# Patient Record
Sex: MALE | Race: WHITE | NOT HISPANIC OR LATINO | Employment: FULL TIME | URBAN - METROPOLITAN AREA
[De-identification: names, ages, dates, MRNs, and addresses within clinical notes are randomized per-mention and may not be internally consistent; named-entity substitution may affect disease eponyms.]

---

## 2020-03-30 ENCOUNTER — OFFICE VISIT (OUTPATIENT)
Dept: URGENT CARE | Facility: MEDICAL CENTER | Age: 25
End: 2020-03-30
Payer: COMMERCIAL

## 2020-03-30 VITALS
HEIGHT: 72 IN | TEMPERATURE: 97.5 F | RESPIRATION RATE: 18 BRPM | DIASTOLIC BLOOD PRESSURE: 82 MMHG | WEIGHT: 207 LBS | OXYGEN SATURATION: 98 % | HEART RATE: 71 BPM | SYSTOLIC BLOOD PRESSURE: 120 MMHG | BODY MASS INDEX: 28.04 KG/M2

## 2020-03-30 DIAGNOSIS — M54.9 UPPER BACK PAIN ON LEFT SIDE: Primary | ICD-10-CM

## 2020-03-30 PROCEDURE — S9083 URGENT CARE CENTER GLOBAL: HCPCS | Performed by: PHYSICIAN ASSISTANT

## 2020-03-30 PROCEDURE — G0382 LEV 3 HOSP TYPE B ED VISIT: HCPCS | Performed by: PHYSICIAN ASSISTANT

## 2020-03-30 RX ORDER — CYCLOBENZAPRINE HCL 10 MG
10 TABLET ORAL 3 TIMES DAILY PRN
Qty: 20 TABLET | Refills: 0 | Status: SHIPPED | OUTPATIENT
Start: 2020-03-30

## 2020-03-30 NOTE — PROGRESS NOTES
[facility]        NAME: Harpreet Obando is a 30 y o  male  : 1995    MRN: [id number]  DATE: 2020  TIME: 6:11 PM    Assessment and Plan   Upper back pain on left side [M54 9]  1  Upper back pain on left side  cyclobenzaprine (FLEXERIL) 10 mg tablet         Patient Instructions     Tylenol or Motrin for pain  Moist heat  Avoid lifting  Muscle relaxers as needed  Follow up with PCP in 3-5 days  Proceed to  ER if symptoms worsen  Chief Complaint     Chief Complaint   Patient presents with    Shoulder Pain     Sharp left shoulder pain x 6 days  Pt denies injury  History of Present Illness       Patient is a 28-year-old male who presents today with left upper back pain for the past 6 days  He denies any known injury or any falls  He does go to the gym a lot and lift but has not lifted recently  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  He denies repetitive motion of the left shoulder or arm  No pain down the arm or numbness or tingling  Has been using Tylenol and Advil with little relief  Review of Systems   Review of Systems   Constitutional: Negative for fever  Respiratory: Negative for shortness of breath  Cardiovascular: Negative for chest pain  Musculoskeletal: Positive for arthralgias  Negative for joint swelling  Skin: Negative for color change  Current Medications       Current Outpatient Medications:     cyclobenzaprine (FLEXERIL) 10 mg tablet, Take 1 tablet (10 mg total) by mouth 3 (three) times a day as needed for muscle spasms, Disp: 20 tablet, Rfl: 0    Current Allergies     Allergies as of 2020 - Reviewed 2020   Allergen Reaction Noted    Penicillins Rash 2014            The following portions of the patient's history were reviewed and updated as appropriate: allergies, current medications, past family history, past medical history, past social history, past surgical history and problem list      History reviewed   No pertinent past medical history  History reviewed  No pertinent surgical history  Family History   Problem Relation Age of Onset    No Known Problems Mother          Medications have been verified  Objective   /82   Pulse 71   Temp 97 5 °F (36 4 °C) (Temporal)   Resp 18   Ht 6' (1 829 m)   Wt 93 9 kg (207 lb)   SpO2 98%   BMI 28 07 kg/m²        Physical Exam     Physical Exam   Constitutional: He appears well-developed and well-nourished  Cardiovascular: Normal rate and regular rhythm  Pulmonary/Chest: Effort normal and breath sounds normal    Musculoskeletal: Normal range of motion  He exhibits tenderness  He exhibits no edema or deformity  Arms:  Skin: Skin is warm and dry

## 2022-12-16 ENCOUNTER — OFFICE VISIT (OUTPATIENT)
Dept: URGENT CARE | Facility: MEDICAL CENTER | Age: 27
End: 2022-12-16

## 2022-12-16 VITALS
RESPIRATION RATE: 16 BRPM | OXYGEN SATURATION: 97 % | HEIGHT: 71 IN | TEMPERATURE: 97.8 F | BODY MASS INDEX: 30.94 KG/M2 | HEART RATE: 80 BPM | SYSTOLIC BLOOD PRESSURE: 134 MMHG | DIASTOLIC BLOOD PRESSURE: 92 MMHG | WEIGHT: 221 LBS

## 2022-12-16 DIAGNOSIS — M10.9 ACUTE GOUT INVOLVING TOE OF LEFT FOOT, UNSPECIFIED CAUSE: Primary | ICD-10-CM

## 2022-12-16 RX ORDER — PREDNISONE 20 MG/1
20 TABLET ORAL 2 TIMES DAILY WITH MEALS
Qty: 10 TABLET | Refills: 0 | Status: SHIPPED | OUTPATIENT
Start: 2022-12-16 | End: 2022-12-21

## 2022-12-16 NOTE — PROGRESS NOTES
[facility]        NAME: Lilly Haynes is a 32 y o  male  : 1995    MRN: [id number]  DATE: 2022  TIME: 2:00 PM    Assessment and Plan   Acute gout involving toe of left foot, unspecified cause [M10 9]  1  Acute gout involving toe of left foot, unspecified cause  predniSONE 20 mg tablet            Patient Instructions     Take med as directed  Follow up with PCP in 3-5 days  Proceed to  ER if symptoms worsen  Chief Complaint     Chief Complaint   Patient presents with   • Gout     Left foot, hx of gout states ran out of allopurinol and foot started to get bad yesterday         History of Present Illness       HPI   Reports left great toe swelling, pain and redness  Duration for several days  Got worse yesterday  Hx of gout  Two previous ones this year  Previously on Allopurinol but has not been taking it  Review of Systems   Review of Systems   Constitutional: Negative for chills and fever  Respiratory: Negative for shortness of breath  Cardiovascular: Negative for chest pain  Musculoskeletal: Positive for arthralgias (left great toe), gait problem (bc of toe pain) and joint swelling  Skin: Positive for color change (red)  Neurological: Negative for numbness           Current Medications       Current Outpatient Medications:   •  predniSONE 20 mg tablet, Take 1 tablet (20 mg total) by mouth 2 (two) times a day with meals for 5 days, Disp: 10 tablet, Rfl: 0  •  cyclobenzaprine (FLEXERIL) 10 mg tablet, Take 1 tablet (10 mg total) by mouth 3 (three) times a day as needed for muscle spasms (Patient not taking: Reported on 2022), Disp: 20 tablet, Rfl: 0    Current Allergies     Allergies as of 2022 - Reviewed 2022   Allergen Reaction Noted   • Penicillins Rash 2014            The following portions of the patient's history were reviewed and updated as appropriate: allergies, current medications, past family history, past medical history, past social history, past surgical history and problem list      No past medical history on file  No past surgical history on file  Family History   Problem Relation Age of Onset   • No Known Problems Mother          Medications have been verified  Objective   /92   Pulse 80   Temp 97 8 °F (36 6 °C) (Temporal)   Resp 16   Ht 5' 11" (1 803 m)   Wt 100 kg (221 lb)   SpO2 97%   BMI 30 82 kg/m²   No LMP for male patient  Physical Exam     Physical Exam  Musculoskeletal:         General: Swelling (mild to moderate swelling of the left great toe) and tenderness (with palpation of the toe and with any Passive ROM) present  No deformity  Skin:     Capillary Refill: Capillary refill takes less than 2 seconds  Findings: Erythema (mild, at the joint) present  Neurological:      Gait: Gait abnormal (limping, favoring the left foot)

## 2023-01-31 ENCOUNTER — OFFICE VISIT (OUTPATIENT)
Dept: URGENT CARE | Facility: MEDICAL CENTER | Age: 28
End: 2023-01-31

## 2023-01-31 VITALS
OXYGEN SATURATION: 97 % | BODY MASS INDEX: 31.36 KG/M2 | DIASTOLIC BLOOD PRESSURE: 93 MMHG | SYSTOLIC BLOOD PRESSURE: 140 MMHG | TEMPERATURE: 97.2 F | WEIGHT: 224 LBS | HEIGHT: 71 IN | RESPIRATION RATE: 18 BRPM | HEART RATE: 78 BPM

## 2023-01-31 DIAGNOSIS — M10.9 ACUTE GOUT INVOLVING TOE OF LEFT FOOT, UNSPECIFIED CAUSE: Primary | ICD-10-CM

## 2023-01-31 RX ORDER — PREDNISONE 20 MG/1
TABLET ORAL
Qty: 15 TABLET | Refills: 0 | Status: SHIPPED | OUTPATIENT
Start: 2023-01-31

## 2023-01-31 RX ORDER — MULTIVITAMIN
1 TABLET ORAL DAILY
COMMUNITY

## 2023-01-31 NOTE — PATIENT INSTRUCTIONS
1   Over-the-counter ibuprofen and/or acetaminophen as needed for pain  2   Prednisone as prescribed  3   Ice and elevate the left foot 3-4 times daily for 20 to 30 minutes until symptoms improve  4   Go to the ER immediately for any significantly worsening symptoms  5   Follow-up with your primary care provider within 1 week

## 2023-01-31 NOTE — PROGRESS NOTES
[facility]        NAME: Talitha Blizzard is a 32 y o  male  : 1995    MRN: [id number]  DATE: 2023  TIME: 10:37 AM    Assessment and Plan   Acute gout involving toe of left foot, unspecified cause [M10 9]  1  Acute gout involving toe of left foot, unspecified cause  predniSONE 20 mg tablet            Patient Instructions     1  Over-the-counter ibuprofen and/or acetaminophen as needed for pain  2   Prednisone as prescribed  3   Ice and elevate the left foot 3-4 times daily for 20 to 30 minutes until symptoms improve  4   Go to the ER immediately for any significantly worsening symptoms  5   Follow-up with your primary care provider within 1 week  Chief Complaint     Chief Complaint   Patient presents with   • Foot Pain     Pt  With pain and swelling at the base of his left great toe that began yesterday  Has a hx of gout  History of Present Illness       27-year-old male patient with a 1 day history of severe left great toe pain, swelling, redness  Symptoms are consistent with previous episodes of gout  Patient denies any injury or exacerbating factors  Denies any dietary changes  Difficulty weightbearing due to pain  Review of Systems   Review of Systems   Constitutional: Negative for chills and fever  HENT: Negative for ear pain and sore throat  Eyes: Negative for pain and visual disturbance  Respiratory: Negative for cough and shortness of breath  Cardiovascular: Negative for chest pain and palpitations  Gastrointestinal: Negative for abdominal pain and vomiting  Genitourinary: Negative for dysuria and hematuria  Musculoskeletal: Positive for arthralgias and joint swelling  Negative for back pain  Skin: Positive for color change  Negative for rash  Neurological: Negative for seizures and syncope  All other systems reviewed and are negative          Current Medications       Current Outpatient Medications:   •  Multiple Vitamin (multivitamin) tablet, Take 1 tablet by mouth daily, Disp: , Rfl:   •  predniSONE 20 mg tablet, Take 3 tabs all at once daily for 3 days then 2 tabs for 2 days then 1 tab for 2 days  , Disp: 15 tablet, Rfl: 0  •  cyclobenzaprine (FLEXERIL) 10 mg tablet, Take 1 tablet (10 mg total) by mouth 3 (three) times a day as needed for muscle spasms (Patient not taking: Reported on 12/16/2022), Disp: 20 tablet, Rfl: 0    Current Allergies     Allergies as of 01/31/2023 - Reviewed 12/16/2022   Allergen Reaction Noted   • Penicillins Rash 07/24/2014            The following portions of the patient's history were reviewed and updated as appropriate: allergies, current medications, past family history, past medical history, past social history, past surgical history and problem list      No past medical history on file  No past surgical history on file  Family History   Problem Relation Age of Onset   • No Known Problems Mother          Medications have been verified  Objective   /93   Pulse 78   Temp (!) 97 2 °F (36 2 °C)   Resp 18   Ht 5' 11" (1 803 m)   Wt 102 kg (224 lb)   SpO2 97%   BMI 31 24 kg/m²        Physical Exam     Physical Exam  Vitals and nursing note reviewed  Constitutional:       Appearance: Normal appearance  HENT:      Head: Normocephalic  Nose: Nose normal       Mouth/Throat:      Mouth: Mucous membranes are dry  Pharynx: Oropharynx is clear  Eyes:      Conjunctiva/sclera: Conjunctivae normal       Pupils: Pupils are equal, round, and reactive to light  Cardiovascular:      Rate and Rhythm: Normal rate and regular rhythm  Pulses: Normal pulses  Pulmonary:      Effort: Pulmonary effort is normal       Breath sounds: Normal breath sounds  Abdominal:      Tenderness: There is no abdominal tenderness  Musculoskeletal:      Cervical back: Normal range of motion and neck supple  Comments: Left great toe is tender and swollen over the MTP joint    There is redness and slight warmth  No red streaking or other signs of infection  Exquisite pain with attempts at range of motion both actively and passively  Skin:     General: Skin is warm and dry  Capillary Refill: Capillary refill takes less than 2 seconds  Neurological:      General: No focal deficit present  Mental Status: He is alert and oriented to person, place, and time     Psychiatric:         Mood and Affect: Mood normal          Behavior: Behavior normal

## 2023-02-16 ENCOUNTER — RA CDI HCC (OUTPATIENT)
Dept: OTHER | Facility: HOSPITAL | Age: 28
End: 2023-02-16

## 2023-02-16 NOTE — PROGRESS NOTES
[facility]  coding opportunities       Chart reviewed, no opportunity found: CHART REVIEWED, NO OPPORTUNITY FOUND      This is a reminder to address ALL HCC (risk adjustment) codes as found on active problem list for 2023 as patient scores reset to zero KEYONNA      Patients Insurance        Commercial Insurance: [employer]

## 2023-02-23 ENCOUNTER — OFFICE VISIT (OUTPATIENT)
Dept: FAMILY MEDICINE CLINIC | Facility: CLINIC | Age: 28
End: 2023-02-23

## 2023-02-23 VITALS
SYSTOLIC BLOOD PRESSURE: 120 MMHG | HEART RATE: 68 BPM | OXYGEN SATURATION: 97 % | HEIGHT: 71 IN | DIASTOLIC BLOOD PRESSURE: 80 MMHG | BODY MASS INDEX: 30.94 KG/M2 | WEIGHT: 221 LBS

## 2023-02-23 DIAGNOSIS — Z13.220 SCREENING, LIPID: ICD-10-CM

## 2023-02-23 DIAGNOSIS — M1A.9XX0 CHRONIC GOUT INVOLVING TOE WITHOUT TOPHUS, UNSPECIFIED CAUSE, UNSPECIFIED LATERALITY: Primary | ICD-10-CM

## 2023-02-23 PROBLEM — M10.9 GOUT: Status: ACTIVE | Noted: 2023-02-23

## 2023-02-23 RX ORDER — ALLOPURINOL 100 MG/1
100 TABLET ORAL DAILY
Qty: 30 TABLET | Refills: 0 | Status: SHIPPED | OUTPATIENT
Start: 2023-02-23 | End: 2023-03-03 | Stop reason: SDUPTHER

## 2023-02-23 NOTE — PROGRESS NOTES
Subjective:      Patient ID: Parry Bernheim is a 32 y o  male  HPI    Patient is here to establish care  Patient has history of recurrent gout affecting his great toe  most recent episode was over 4 weeks ago  which  was treated with oral prednisone  Patient states that he has had at least 4 episodes in the past few months  No recent labs available  According to patient his uric acid a few years ago was 16  Requesting to be started on allopurinol which he took a few years ago  Patient denies any symptoms of fever/chills/chest pain or shortness of breath  Currently asymptomatic  According to patient his father had type I diabetes, coronary artery disease   Claressa Boas from complications of above-mentioned problems  Past Medical History:   Diagnosis Date   • Gout        Family History   Problem Relation Age of Onset   • No Known Problems Mother    • Diabetes Father    • Coronary artery disease Father        History reviewed  No pertinent surgical history  reports that he has never smoked  He uses smokeless tobacco  He reports current alcohol use  He reports that he does not use drugs  Current Outpatient Medications:   •  allopurinol (ZYLOPRIM) 100 mg tablet, Take 1 tablet (100 mg total) by mouth daily, Disp: 30 tablet, Rfl: 0  •  Multiple Vitamin (multivitamin) tablet, Take 1 tablet by mouth daily, Disp: , Rfl:   •  predniSONE 20 mg tablet, Take 3 tabs all at once daily for 3 days then 2 tabs for 2 days then 1 tab for 2 days  , Disp: 15 tablet, Rfl: 0  •  cyclobenzaprine (FLEXERIL) 10 mg tablet, Take 1 tablet (10 mg total) by mouth 3 (three) times a day as needed for muscle spasms, Disp: 20 tablet, Rfl: 0    The following portions of the patient's history were reviewed and updated as appropriate: allergies, current medications, past family history, past medical history, past social history, past surgical history and problem list     Review of Systems   Constitutional: Negative      Respiratory: Negative  Cardiovascular: Negative  Gastrointestinal: Negative  Musculoskeletal: Negative  Objective:    /80   Pulse 68   Ht 5' 11" (1 803 m)   Wt 100 kg (221 lb)   SpO2 97%   BMI 30 82 kg/m²      Physical Exam  Constitutional:       Appearance: Normal appearance  Cardiovascular:      Heart sounds: Normal heart sounds  Pulmonary:      Breath sounds: Normal breath sounds  Neurological:      Mental Status: He is oriented to person, place, and time  Psychiatric:         Mood and Affect: Mood normal            No results found for this or any previous visit (from the past 1008 hour(s))  Assessment/Plan:    No problem-specific Assessment & Plan notes found for this encounter  Problem List Items Addressed This Visit        Other    Gout - Primary     Patient advised to get metabolic labs including uric acid to assess  baseline  Started on allopurinol 100 mg  We will follow-up after 1 week to review labs  Relevant Medications    allopurinol (ZYLOPRIM) 100 mg tablet    Other Relevant Orders    Comprehensive metabolic panel    Uric acid    Screening, lipid    Relevant Orders    Lipid panel     I have spent a total time of 30 minutes on 02/23/23 in caring for this patient including Risks and benefits of tx options, Instructions for management, Patient and family education, Importance of tx compliance, Risk factor reductions, Impressions, Counseling / Coordination of care, Documenting in the medical record, Reviewing / ordering tests, medicine, procedures   and Obtaining or reviewing history

## 2023-03-02 LAB
ALBUMIN SERPL-MCNC: 4.6 G/DL (ref 3.6–5.1)
ALBUMIN/GLOB SERPL: 1.7 (CALC) (ref 1–2.5)
ALP SERPL-CCNC: 90 U/L (ref 36–130)
ALT SERPL-CCNC: 51 U/L (ref 9–46)
AST SERPL-CCNC: 27 U/L (ref 10–40)
BILIRUB SERPL-MCNC: 0.9 MG/DL (ref 0.2–1.2)
BUN SERPL-MCNC: 12 MG/DL (ref 7–25)
BUN/CREAT SERPL: ABNORMAL (CALC) (ref 6–22)
CALCIUM SERPL-MCNC: 9.7 MG/DL (ref 8.6–10.3)
CHLORIDE SERPL-SCNC: 102 MMOL/L (ref 98–110)
CHOLEST SERPL-MCNC: 242 MG/DL
CHOLEST/HDLC SERPL: 4.7 (CALC)
CO2 SERPL-SCNC: 27 MMOL/L (ref 20–32)
CREAT SERPL-MCNC: 0.78 MG/DL (ref 0.6–1.24)
GFR/BSA.PRED SERPLBLD CYS-BASED-ARV: 124 ML/MIN/1.73M2
GLOBULIN SER CALC-MCNC: 2.7 G/DL (CALC) (ref 1.9–3.7)
GLUCOSE SERPL-MCNC: 101 MG/DL (ref 65–99)
HDLC SERPL-MCNC: 52 MG/DL
LDLC SERPL CALC-MCNC: 149 MG/DL (CALC)
NONHDLC SERPL-MCNC: 190 MG/DL (CALC)
POTASSIUM SERPL-SCNC: 4.2 MMOL/L (ref 3.5–5.3)
PROT SERPL-MCNC: 7.3 G/DL (ref 6.1–8.1)
SODIUM SERPL-SCNC: 137 MMOL/L (ref 135–146)
TRIGL SERPL-MCNC: 241 MG/DL
URATE SERPL-MCNC: 7.6 MG/DL (ref 4–8)

## 2023-03-03 ENCOUNTER — OFFICE VISIT (OUTPATIENT)
Dept: FAMILY MEDICINE CLINIC | Facility: CLINIC | Age: 28
End: 2023-03-03

## 2023-03-03 VITALS
HEIGHT: 71 IN | OXYGEN SATURATION: 99 % | SYSTOLIC BLOOD PRESSURE: 122 MMHG | BODY MASS INDEX: 31.11 KG/M2 | WEIGHT: 222.2 LBS | HEART RATE: 73 BPM | DIASTOLIC BLOOD PRESSURE: 80 MMHG

## 2023-03-03 DIAGNOSIS — R73.01 ELEVATED FASTING BLOOD SUGAR: ICD-10-CM

## 2023-03-03 DIAGNOSIS — E78.2 MIXED HYPERLIPIDEMIA: ICD-10-CM

## 2023-03-03 DIAGNOSIS — M1A.9XX0 CHRONIC GOUT INVOLVING TOE WITHOUT TOPHUS, UNSPECIFIED CAUSE, UNSPECIFIED LATERALITY: Primary | ICD-10-CM

## 2023-03-03 RX ORDER — COLCHICINE 0.6 MG/1
TABLET ORAL
Qty: 15 TABLET | Refills: 0 | Status: SHIPPED | OUTPATIENT
Start: 2023-03-03

## 2023-03-03 RX ORDER — ALLOPURINOL 100 MG/1
100 TABLET ORAL DAILY
Qty: 90 TABLET | Refills: 1 | Status: SHIPPED | OUTPATIENT
Start: 2023-03-03

## 2023-03-03 NOTE — ASSESSMENT & PLAN NOTE
Uric acid 7 6  Patient has had intermittent flareups of gout which have responded to prednisone  Started on allopurinol 100 mg  Advised to improve hydration    Repeat metabolic labs including uric acid at 4 months interval

## 2023-03-03 NOTE — PROGRESS NOTES
Subjective:      Patient ID: Scott Delgado is a 32 y o  male  Hyperlipidemia  This is a new problem  This is a new diagnosis  The problem is uncontrolled  Recent lipid tests were reviewed and are high  Factors aggravating his hyperlipidemia include fatty foods  Pertinent negatives include no chest pain or focal sensory loss  He is currently on no antihyperlipidemic treatment  Risk factors for coronary artery disease include dyslipidemia and male sex  Patient is here to follow-up on gout and review labs  Noted to have a uric acid of 7 6  Was started on allopurinol 1 week ago which he has been tolerating well  His labs also reveal borderline fasting blood sugar of 101  Past Medical History:   Diagnosis Date   • Gout        Family History   Problem Relation Age of Onset   • No Known Problems Mother    • Diabetes Father    • Coronary artery disease Father        History reviewed  No pertinent surgical history  reports that he has never smoked  He uses smokeless tobacco  He reports current alcohol use  He reports that he does not use drugs  Current Outpatient Medications:   •  allopurinol (ZYLOPRIM) 100 mg tablet, Take 1 tablet (100 mg total) by mouth daily, Disp: 90 tablet, Rfl: 1  •  colchicine (COLCRYS) 0 6 mg tablet, Take 2 tablet together, repeat 1 tablet after 1 hour , Disp: 15 tablet, Rfl: 0  •  cyclobenzaprine (FLEXERIL) 10 mg tablet, Take 1 tablet (10 mg total) by mouth 3 (three) times a day as needed for muscle spasms, Disp: 20 tablet, Rfl: 0  •  Multiple Vitamin (multivitamin) tablet, Take 1 tablet by mouth daily, Disp: , Rfl:   •  predniSONE 20 mg tablet, Take 3 tabs all at once daily for 3 days then 2 tabs for 2 days then 1 tab for 2 days  , Disp: 15 tablet, Rfl: 0    The following portions of the patient's history were reviewed and updated as appropriate: allergies, current medications, past family history, past medical history, past social history, past surgical history and problem list     Review of Systems   Constitutional: Negative  Respiratory: Negative  Cardiovascular: Negative for chest pain  Musculoskeletal: Negative  Objective:    /80   Pulse 73   Ht 5' 11" (1 803 m)   Wt 101 kg (222 lb 3 2 oz)   SpO2 99%   BMI 30 99 kg/m²      Physical Exam  Constitutional:       Appearance: Normal appearance  Cardiovascular:      Heart sounds: Normal heart sounds  Pulmonary:      Breath sounds: Normal breath sounds  Musculoskeletal:      Right lower leg: No edema  Left lower leg: No edema  Neurological:      Mental Status: He is oriented to person, place, and time     Psychiatric:         Mood and Affect: Mood normal            Recent Results (from the past 1008 hour(s))   Lipid panel    Collection Time: 03/01/23  2:26 PM   Result Value Ref Range    Total Cholesterol 242 (H) <200 mg/dL    HDL 52 > OR = 40 mg/dL    Triglycerides 241 (H) <150 mg/dL    LDL Calculated 149 (H) mg/dL (calc)    Chol HDLC Ratio 4 7 <5 0 (calc)    Non-HDL Cholesterol 190 (H) <130 mg/dL (calc)   Uric acid    Collection Time: 03/01/23  2:26 PM   Result Value Ref Range    Uric Acid 7 6 4 0 - 8 0 mg/dL   Comprehensive metabolic panel    Collection Time: 03/01/23  2:26 PM   Result Value Ref Range    Glucose, Random 101 (H) 65 - 99 mg/dL    BUN 12 7 - 25 mg/dL    Creatinine 0 78 0 60 - 1 24 mg/dL    eGFR 124 > OR = 60 mL/min/1 73m2    SL AMB BUN/CREATININE RATIO NOT APPLICABLE 6 - 22 (calc)    Sodium 137 135 - 146 mmol/L    Potassium 4 2 3 5 - 5 3 mmol/L    Chloride 102 98 - 110 mmol/L    CO2 27 20 - 32 mmol/L    Calcium 9 7 8 6 - 10 3 mg/dL    Protein, Total 7 3 6 1 - 8 1 g/dL    Albumin 4 6 3 6 - 5 1 g/dL    Globulin 2 7 1 9 - 3 7 g/dL (calc)    Albumin/Globulin Ratio 1 7 1 0 - 2 5 (calc)    TOTAL BILIRUBIN 0 9 0 2 - 1 2 mg/dL    Alkaline Phosphatase 90 36 - 130 U/L    AST 27 10 - 40 U/L    ALT 51 (H) 9 - 46 U/L       Assessment/Plan:    No problem-specific Assessment & Plan notes found for this encounter  Problem List Items Addressed This Visit        Other    Gout - Primary     Uric acid 7 6  Patient has had intermittent flareups of gout which have responded to prednisone  Started on allopurinol 100 mg  Advised to improve hydration  Repeat metabolic labs including uric acid at 4 months interval          Relevant Medications    allopurinol (ZYLOPRIM) 100 mg tablet    colchicine (COLCRYS) 0 6 mg tablet    Other Relevant Orders    Uric acid    Elevated fasting blood sugar     Advised low-carb diet  Check A1c to rule out diabetes  Relevant Orders    Comprehensive metabolic panel    Hemoglobin A1C    Mixed hyperlipidemia     Patient noted to have elevated LDL and triglycerides  Does not eat healthy diet  Different treatment options to improve cholesterol panel discussed with patient  Patient would like to start with diet and lifestyle modifications  Recommended to repeat metabolic panel at 4 months           Relevant Orders    Lipid panel

## 2023-03-03 NOTE — ASSESSMENT & PLAN NOTE
Patient noted to have elevated LDL and triglycerides  Does not eat healthy diet  Different treatment options to improve cholesterol panel discussed with patient  Patient would like to start with diet and lifestyle modifications  Recommended to repeat metabolic panel at 4 months

## 2023-04-24 PROBLEM — Z13.220 SCREENING, LIPID: Status: RESOLVED | Noted: 2023-02-23 | Resolved: 2023-04-24

## 2023-07-14 ENCOUNTER — RA CDI HCC (OUTPATIENT)
Dept: OTHER | Facility: HOSPITAL | Age: 28
End: 2023-07-14

## 2023-07-14 NOTE — PROGRESS NOTES
[address] [facility] coding opportunities       Chart reviewed, no opportunity found: CHART REVIEWED, NO OPPORTUNITY FOUND        Patients Insurance        Commercial Insurance: [employer] [place]

## 2023-07-28 DIAGNOSIS — M1A.9XX0 CHRONIC GOUT INVOLVING TOE WITHOUT TOPHUS, UNSPECIFIED CAUSE, UNSPECIFIED LATERALITY: ICD-10-CM

## 2023-07-28 RX ORDER — ALLOPURINOL 100 MG/1
100 TABLET ORAL DAILY
Qty: 30 TABLET | Refills: 0 | Status: SHIPPED | OUTPATIENT
Start: 2023-07-28

## 2023-07-28 NOTE — TELEPHONE ENCOUNTER
Patient requested Allopurinol refill. He was advised that he is in need of labs.   30 day supply sent to the pharmacy until labs are done

## 2023-08-16 ENCOUNTER — APPOINTMENT (OUTPATIENT)
Dept: LAB | Facility: MEDICAL CENTER | Age: 28
End: 2023-08-16
Payer: COMMERCIAL

## 2023-08-16 DIAGNOSIS — Z13.220 SCREENING, LIPID: ICD-10-CM

## 2023-08-16 DIAGNOSIS — R73.01 ELEVATED FASTING BLOOD SUGAR: ICD-10-CM

## 2023-08-16 DIAGNOSIS — E78.2 MIXED HYPERLIPIDEMIA: ICD-10-CM

## 2023-08-16 DIAGNOSIS — M1A.9XX0 CHRONIC GOUT INVOLVING TOE WITHOUT TOPHUS, UNSPECIFIED CAUSE, UNSPECIFIED LATERALITY: ICD-10-CM

## 2023-08-16 LAB
ALBUMIN SERPL BCP-MCNC: 4.2 G/DL (ref 3.5–5)
ALP SERPL-CCNC: 85 U/L (ref 46–116)
ALT SERPL W P-5'-P-CCNC: 76 U/L (ref 12–78)
ANION GAP SERPL CALCULATED.3IONS-SCNC: 4 MMOL/L
AST SERPL W P-5'-P-CCNC: 41 U/L (ref 5–45)
BILIRUB SERPL-MCNC: 1.07 MG/DL (ref 0.2–1)
BUN SERPL-MCNC: 11 MG/DL (ref 5–25)
CALCIUM SERPL-MCNC: 9 MG/DL (ref 8.3–10.1)
CHLORIDE SERPL-SCNC: 107 MMOL/L (ref 96–108)
CHOLEST SERPL-MCNC: 225 MG/DL
CO2 SERPL-SCNC: 26 MMOL/L (ref 21–32)
CREAT SERPL-MCNC: 0.87 MG/DL (ref 0.6–1.3)
EST. AVERAGE GLUCOSE BLD GHB EST-MCNC: 100 MG/DL
GFR SERPL CREATININE-BSD FRML MDRD: 115 ML/MIN/1.73SQ M
GLUCOSE P FAST SERPL-MCNC: 73 MG/DL (ref 65–99)
HBA1C MFR BLD: 5.1 %
HDLC SERPL-MCNC: 48 MG/DL
LDLC SERPL CALC-MCNC: 135 MG/DL (ref 0–100)
NONHDLC SERPL-MCNC: 177 MG/DL
POTASSIUM SERPL-SCNC: 4.2 MMOL/L (ref 3.5–5.3)
PROT SERPL-MCNC: 7.6 G/DL (ref 6.4–8.4)
SODIUM SERPL-SCNC: 137 MMOL/L (ref 135–147)
TRIGL SERPL-MCNC: 210 MG/DL
URATE SERPL-MCNC: 7 MG/DL (ref 3.5–8.5)

## 2023-08-16 PROCEDURE — 36415 COLL VENOUS BLD VENIPUNCTURE: CPT

## 2023-08-16 PROCEDURE — 83036 HEMOGLOBIN GLYCOSYLATED A1C: CPT

## 2023-08-16 PROCEDURE — 84550 ASSAY OF BLOOD/URIC ACID: CPT

## 2023-08-16 PROCEDURE — 80061 LIPID PANEL: CPT

## 2023-08-16 PROCEDURE — 80053 COMPREHEN METABOLIC PANEL: CPT

## 2023-08-17 ENCOUNTER — OFFICE VISIT (OUTPATIENT)
Dept: FAMILY MEDICINE CLINIC | Facility: CLINIC | Age: 28
End: 2023-08-17
Payer: COMMERCIAL

## 2023-08-17 VITALS
OXYGEN SATURATION: 99 % | SYSTOLIC BLOOD PRESSURE: 122 MMHG | WEIGHT: 211 LBS | HEART RATE: 60 BPM | BODY MASS INDEX: 29.54 KG/M2 | HEIGHT: 71 IN | DIASTOLIC BLOOD PRESSURE: 78 MMHG

## 2023-08-17 DIAGNOSIS — E78.2 MIXED HYPERLIPIDEMIA: ICD-10-CM

## 2023-08-17 DIAGNOSIS — M1A.9XX0 CHRONIC GOUT INVOLVING TOE WITHOUT TOPHUS, UNSPECIFIED CAUSE, UNSPECIFIED LATERALITY: Primary | ICD-10-CM

## 2023-08-17 DIAGNOSIS — Z23 NEED FOR VACCINATION: ICD-10-CM

## 2023-08-17 PROCEDURE — 90715 TDAP VACCINE 7 YRS/> IM: CPT | Performed by: FAMILY MEDICINE

## 2023-08-17 PROCEDURE — 90471 IMMUNIZATION ADMIN: CPT | Performed by: FAMILY MEDICINE

## 2023-08-17 PROCEDURE — 99214 OFFICE O/P EST MOD 30 MIN: CPT | Performed by: FAMILY MEDICINE

## 2023-08-17 RX ORDER — ALLOPURINOL 100 MG/1
200 TABLET ORAL DAILY
Qty: 180 TABLET | Refills: 1 | Status: SHIPPED | OUTPATIENT
Start: 2023-08-17

## 2023-08-17 RX ORDER — COLCHICINE 0.6 MG/1
TABLET ORAL
Qty: 15 TABLET | Refills: 1 | Status: SHIPPED | OUTPATIENT
Start: 2023-08-17

## 2023-08-17 NOTE — ASSESSMENT & PLAN NOTE
Patient's uric acid improved slightly on allopurinol 100 mg. Patient did not have any acute flareups since. Patient's uric acid is 7. Encouraged to continue with low protein diet, improve hydration. Increase allopurinol to 200 mg daily.   Continue monitoring with metabolic labs at 6-month interval.

## 2023-08-17 NOTE — PROGRESS NOTES
Subjective:      Patient ID: Bolivar Heaton is a 25 y.o. male. HPI    Patient is here for routine 6-month follow-up. Has history of gout and dyslipidemia. Metabolic labs reviewed with patient today. His uric acid has slightly improved on allopurinol. Patient has been trying to eat healthy and lose weight. Past Medical History:   Diagnosis Date   • Gout        Family History   Problem Relation Age of Onset   • No Known Problems Mother    • Diabetes Father    • Coronary artery disease Father        History reviewed. No pertinent surgical history. reports that he has never smoked. He uses smokeless tobacco. He reports current alcohol use. He reports that he does not use drugs. Current Outpatient Medications:   •  allopurinol (ZYLOPRIM) 100 mg tablet, Take 2 tablets (200 mg total) by mouth daily, Disp: 180 tablet, Rfl: 1  •  colchicine (COLCRYS) 0.6 mg tablet, Take 2 tablet together, repeat 1 tablet after 1 hour., Disp: 15 tablet, Rfl: 1  •  Multiple Vitamin (multivitamin) tablet, Take 1 tablet by mouth daily, Disp: , Rfl:   •  cyclobenzaprine (FLEXERIL) 10 mg tablet, Take 1 tablet (10 mg total) by mouth 3 (three) times a day as needed for muscle spasms (Patient not taking: Reported on 8/17/2023), Disp: 20 tablet, Rfl: 0  •  predniSONE 20 mg tablet, Take 3 tabs all at once daily for 3 days then 2 tabs for 2 days then 1 tab for 2 days. (Patient not taking: Reported on 8/17/2023), Disp: 15 tablet, Rfl: 0    The following portions of the patient's history were reviewed and updated as appropriate: allergies, current medications, past family history, past medical history, past social history, past surgical history and problem list.    Review of Systems   Constitutional: Negative. Respiratory: Negative. Cardiovascular: Negative.             Objective:    /78   Pulse 60   Ht 5' 11" (1.803 m)   Wt 95.7 kg (211 lb)   SpO2 99%   BMI 29.43 kg/m²      Physical Exam  Constitutional: Appearance: Normal appearance. Cardiovascular:      Heart sounds: Normal heart sounds. Pulmonary:      Breath sounds: Normal breath sounds. Recent Results (from the past 1008 hour(s))   Comprehensive metabolic panel    Collection Time: 08/16/23  1:10 PM   Result Value Ref Range    Sodium 137 135 - 147 mmol/L    Potassium 4.2 3.5 - 5.3 mmol/L    Chloride 107 96 - 108 mmol/L    CO2 26 21 - 32 mmol/L    ANION GAP 4 mmol/L    BUN 11 5 - 25 mg/dL    Creatinine 0.87 0.60 - 1.30 mg/dL    Glucose, Fasting 73 65 - 99 mg/dL    Calcium 9.0 8.3 - 10.1 mg/dL    AST 41 5 - 45 U/L    ALT 76 12 - 78 U/L    Alkaline Phosphatase 85 46 - 116 U/L    Total Protein 7.6 6.4 - 8.4 g/dL    Albumin 4.2 3.5 - 5.0 g/dL    Total Bilirubin 1.07 (H) 0.20 - 1.00 mg/dL    eGFR 115 ml/min/1.73sq m   Lipid panel    Collection Time: 08/16/23  1:10 PM   Result Value Ref Range    Cholesterol 225 (H) See Comment mg/dL    Triglycerides 210 (H) See Comment mg/dL    HDL, Direct 48 >=40 mg/dL    LDL Calculated 135 (H) 0 - 100 mg/dL    Non-HDL-Chol (CHOL-HDL) 177 mg/dl   Uric acid    Collection Time: 08/16/23  1:10 PM   Result Value Ref Range    Uric Acid 7.0 3.5 - 8.5 mg/dL   Hemoglobin A1C    Collection Time: 08/16/23  1:10 PM   Result Value Ref Range    Hemoglobin A1C 5.1 Normal 4.0-5.6%; PreDiabetic 5.7-6.4%; Diabetic >=6.5%; Glycemic control for adults with diabetes <7.0% %     mg/dl       Assessment/Plan:    No problem-specific Assessment & Plan notes found for this encounter. Problem List Items Addressed This Visit        Other    Gout - Primary     Patient's uric acid improved slightly on allopurinol 100 mg. Patient did not have any acute flareups since. Patient's uric acid is 7. Encouraged to continue with low protein diet, improve hydration. Increase allopurinol to 200 mg daily.   Continue monitoring with metabolic labs at 6-month interval.         Relevant Medications    allopurinol (ZYLOPRIM) 100 mg tablet colchicine (COLCRYS) 0.6 mg tablet    Other Relevant Orders    Uric acid    Mixed hyperlipidemia     Patient's cholesterol, including triglycerides and LDL has improved slightly. No other cardiac risk factors including no family history of CVA. Different treatment options discussed with patient. Patient would like to continue with lifestyle modifications. Encouraged to continue monitoring with metabolic labs at 6-month interval.         Relevant Orders    Comprehensive metabolic panel    Lipid panel    Need for vaccination    Relevant Orders    TDAP VACCINE GREATER THAN OR EQUAL TO 8YO IM     BMI Counseling: Body mass index is 29.43 kg/m². The BMI is above normal. Nutrition recommendations include decreasing overall calorie intake. Exercise recommendations include moderate aerobic physical activity for 150 minutes/week.

## 2023-08-17 NOTE — ASSESSMENT & PLAN NOTE
Patient's cholesterol, including triglycerides and LDL has improved slightly. No other cardiac risk factors including no family history of CVA. Different treatment options discussed with patient. Patient would like to continue with lifestyle modifications.   Encouraged to continue monitoring with metabolic labs at 6-month interval.

## 2024-02-29 DIAGNOSIS — M1A.9XX0 CHRONIC GOUT INVOLVING TOE WITHOUT TOPHUS, UNSPECIFIED CAUSE, UNSPECIFIED LATERALITY: ICD-10-CM

## 2024-02-29 RX ORDER — ALLOPURINOL 100 MG/1
200 TABLET ORAL DAILY
Qty: 60 TABLET | Refills: 5 | Status: SHIPPED | OUTPATIENT
Start: 2024-02-29

## 2024-03-15 ENCOUNTER — RA CDI HCC (OUTPATIENT)
Dept: OTHER | Facility: HOSPITAL | Age: 29
End: 2024-03-15

## 2024-03-15 NOTE — PROGRESS NOTES
HCC coding opportunities       Chart reviewed, no opportunity found: CHART REVIEWED, NO OPPORTUNITY FOUND      Found on active problem list - Not on current BPA  Patients Insurance        Commercial Insurance: [employer]

## 2024-03-19 LAB
ALBUMIN SERPL-MCNC: 4.7 G/DL (ref 3.6–5.1)
ALBUMIN/GLOB SERPL: 1.7 (CALC) (ref 1–2.5)
ALP SERPL-CCNC: 100 U/L (ref 36–130)
ALT SERPL-CCNC: 57 U/L (ref 9–46)
AST SERPL-CCNC: 29 U/L (ref 10–40)
BILIRUB SERPL-MCNC: 0.9 MG/DL (ref 0.2–1.2)
BUN SERPL-MCNC: 12 MG/DL (ref 7–25)
BUN/CREAT SERPL: ABNORMAL (CALC) (ref 6–22)
CALCIUM SERPL-MCNC: 9.5 MG/DL (ref 8.6–10.3)
CHLORIDE SERPL-SCNC: 102 MMOL/L (ref 98–110)
CHOLEST SERPL-MCNC: 231 MG/DL
CHOLEST/HDLC SERPL: 4.4 (CALC)
CO2 SERPL-SCNC: 29 MMOL/L (ref 20–32)
CREAT SERPL-MCNC: 0.73 MG/DL (ref 0.6–1.26)
GFR/BSA.PRED SERPLBLD CYS-BASED-ARV: 126 ML/MIN/1.73M2
GLOBULIN SER CALC-MCNC: 2.8 G/DL (CALC) (ref 1.9–3.7)
GLUCOSE SERPL-MCNC: 93 MG/DL (ref 65–139)
HDLC SERPL-MCNC: 53 MG/DL
LDLC SERPL CALC-MCNC: 134 MG/DL (CALC)
NONHDLC SERPL-MCNC: 178 MG/DL (CALC)
POTASSIUM SERPL-SCNC: 4.3 MMOL/L (ref 3.5–5.3)
PROT SERPL-MCNC: 7.5 G/DL (ref 6.1–8.1)
SODIUM SERPL-SCNC: 138 MMOL/L (ref 135–146)
TRIGL SERPL-MCNC: 307 MG/DL
URATE SERPL-MCNC: 6.7 MG/DL (ref 4–8)

## 2024-03-22 ENCOUNTER — OFFICE VISIT (OUTPATIENT)
Dept: FAMILY MEDICINE CLINIC | Facility: CLINIC | Age: 29
End: 2024-03-22
Payer: COMMERCIAL

## 2024-03-22 VITALS
HEIGHT: 71 IN | DIASTOLIC BLOOD PRESSURE: 80 MMHG | HEART RATE: 70 BPM | WEIGHT: 209 LBS | BODY MASS INDEX: 29.26 KG/M2 | OXYGEN SATURATION: 99 % | SYSTOLIC BLOOD PRESSURE: 122 MMHG

## 2024-03-22 DIAGNOSIS — E78.2 MIXED HYPERLIPIDEMIA: ICD-10-CM

## 2024-03-22 DIAGNOSIS — H61.23 BILATERAL IMPACTED CERUMEN: ICD-10-CM

## 2024-03-22 DIAGNOSIS — Z00.00 ANNUAL PHYSICAL EXAM: ICD-10-CM

## 2024-03-22 DIAGNOSIS — R73.01 ELEVATED FASTING BLOOD SUGAR: Primary | ICD-10-CM

## 2024-03-22 DIAGNOSIS — H60.63 CHRONIC OTITIS EXTERNA OF BOTH EARS, UNSPECIFIED TYPE: ICD-10-CM

## 2024-03-22 DIAGNOSIS — M1A.9XX0 CHRONIC GOUT INVOLVING TOE WITHOUT TOPHUS, UNSPECIFIED CAUSE, UNSPECIFIED LATERALITY: ICD-10-CM

## 2024-03-22 DIAGNOSIS — Z00.00 PREVENTATIVE HEALTH CARE: ICD-10-CM

## 2024-03-22 DIAGNOSIS — R74.01 ELEVATED ALT MEASUREMENT: ICD-10-CM

## 2024-03-22 PROCEDURE — 99395 PREV VISIT EST AGE 18-39: CPT | Performed by: FAMILY MEDICINE

## 2024-03-22 PROCEDURE — 99215 OFFICE O/P EST HI 40 MIN: CPT | Performed by: FAMILY MEDICINE

## 2024-03-22 RX ORDER — ALLOPURINOL 100 MG/1
200 TABLET ORAL DAILY
Qty: 180 TABLET | Refills: 1 | Status: SHIPPED | OUTPATIENT
Start: 2024-03-22

## 2024-03-22 RX ORDER — CIPROFLOXACIN AND DEXAMETHASONE 3; 1 MG/ML; MG/ML
4 SUSPENSION/ DROPS AURICULAR (OTIC) 2 TIMES DAILY
Qty: 7.5 ML | Refills: 0 | Status: SHIPPED | OUTPATIENT
Start: 2024-03-22

## 2024-03-22 NOTE — ASSESSMENT & PLAN NOTE
Impacted cerumen removed today.  Patient started on Ciprodex eardrops in view of inflamed ear canal.

## 2024-03-22 NOTE — ASSESSMENT & PLAN NOTE
Patient's uric acid improved  on allopurinol 200 mg.  Patient did not have any acute flareups since.  Patient's uric acid is 6.7.  Encouraged to continue with low protein diet, improve hydration.  Increase allopurinol to 200 mg daily.  Continue monitoring with metabolic labs at 6-month interval.

## 2024-03-22 NOTE — ASSESSMENT & PLAN NOTE
Patient noted to have borderline high ALT.  Advised liver ultrasound.  Patient advised to avoid hepatotoxins including Tylenol and alcohol.  Continue monitoring.

## 2024-03-22 NOTE — PROGRESS NOTES
ADULT ANNUAL PHYSICAL  [facility]    NAME: Tacho Robles  AGE: 28 y.o. SEX: male  : 1995     DATE: 3/22/2024     Assessment and Plan:     Problem List Items Addressed This Visit          Nervous and Auditory    Bilateral impacted cerumen     Impacted cerumen removed today.  Patient started on Ciprodex eardrops in view of inflamed ear canal.         Chronic otitis externa of both ears    Relevant Medications    ciprofloxacin-dexamethasone (CIPRODEX) otic suspension       Orthopedic/Musculoskeletal    Gout     Patient's uric acid improved  on allopurinol 200 mg.  Patient did not have any acute flareups since.  Patient's uric acid is 6.7.  Encouraged to continue with low protein diet, improve hydration.  Increase allopurinol to 200 mg daily.  Continue monitoring with metabolic labs at 6-month interval.         Relevant Medications    allopurinol (ZYLOPRIM) 100 mg tablet    Other Relevant Orders    Uric acid       Other    Preventative health care    Elevated fasting blood sugar - Primary     Advised low-carb diet.  Check A1c to rule out diabetes.         Relevant Orders    Comprehensive metabolic panel    Hemoglobin A1C    Mixed hyperlipidemia     Patient has a history of dyslipidemia.  Noted to have elevated triglycerides, 307,  .   States got busy and was unable to pay attention to his diet.  Different treatment options discussed with patient.  Patient does not want to start any medications yet.  Patient is going to make some lifestyle changes and is hopeful that would improve his cholesterol panel.  Recommended to repeat labs at 4 months interval.          Relevant Orders    Lipid panel    US abdomen limited    Elevated ALT measurement     Patient noted to have borderline high ALT.  Advised liver ultrasound.  Patient advised to avoid hepatotoxins including Tylenol and alcohol.  Continue monitoring.         Relevant Orders    US abdomen limited        Immunizations and preventive care screenings were discussed with patient today. Appropriate education was printed on patient's after visit summary.    Counseling:  Dental Health: discussed importance of regular tooth brushing, flossing, and dental visits.  Exercise: the importance of regular exercise/physical activity was discussed. Recommend exercise 3-5 times per week for at least 30 minutes.          No follow-ups on file.     Chief Complaint:     Chief Complaint   Patient presents with    Physical Exam    Follow-up     Review labs      History of Present Illness:     Adult Annual Physical   Patient here for a comprehensive physical exam. The patient reports problems - right ear discomfort .  Patient is here for routine 6-month follow-up.  Has history of gout and dyslipidemia.  Metabolic labs reviewed with patient today.  His uric acid has slightly improved on allopurinol.    Metabolic labs reveal borderline fasting blood sugar, cholesterol control worsen,   ALT borderline.  His BMI is 29.    Diet and Physical Activity  Diet/Nutrition: well balanced diet.   Exercise: walking.      Depression Screening  PHQ-2/9 Depression Screening    Little interest or pleasure in doing things: 0 - not at all  Feeling down, depressed, or hopeless: 0 - not at all  PHQ-2 Score: 0  PHQ-2 Interpretation: Negative depression screen       General Health  Sleep: sleeps well.   Hearing: normal - bilateral.  Vision: no vision problems.   Dental: regular dental visits.            Review of Systems:     Review of Systems   Constitutional: Negative.    HENT:  Positive for hearing loss (Rt side).    Respiratory: Negative.     Cardiovascular: Negative.       Past Medical History:     Past Medical History:   Diagnosis Date    Gout       Past Surgical History:     History reviewed. No pertinent surgical history.   Social History:     Social History     Socioeconomic History    Marital status: Single     Spouse name: None    Number of  "children: None    Years of education: None    Highest education level: None   Occupational History    None   Tobacco Use    Smoking status: Never    Smokeless tobacco: Current    Tobacco comments:     nicotine pouches   Vaping Use    Vaping status: Never Used   Substance and Sexual Activity    Alcohol use: Yes     Comment: socailly    Drug use: Never    Sexual activity: None   Other Topics Concern    None   Social History Narrative    None     Social Determinants of Health     Financial Resource Strain: Not on file   Food Insecurity: Not on file   Transportation Needs: Not on file   Physical Activity: Not on file   Stress: Not on file   Social Connections: Not on file   Intimate Partner Violence: Not on file   Housing Stability: Not on file      Family History:     Family History   Problem Relation Age of Onset    No Known Problems Mother     Diabetes Father     Coronary artery disease Father       Current Medications:     Current Outpatient Medications   Medication Sig Dispense Refill    allopurinol (ZYLOPRIM) 100 mg tablet Take 2 tablets (200 mg total) by mouth daily 180 tablet 1    ciprofloxacin-dexamethasone (CIPRODEX) otic suspension Administer 4 drops into both ears 2 (two) times a day 7.5 mL 0    colchicine (COLCRYS) 0.6 mg tablet Take 2 tablet together, repeat 1 tablet after 1 hour. 15 tablet 1    Multiple Vitamin (multivitamin) tablet Take 1 tablet by mouth daily      cyclobenzaprine (FLEXERIL) 10 mg tablet Take 1 tablet (10 mg total) by mouth 3 (three) times a day as needed for muscle spasms (Patient not taking: Reported on 8/17/2023) 20 tablet 0    predniSONE 20 mg tablet Take 3 tabs all at once daily for 3 days then 2 tabs for 2 days then 1 tab for 2 days. (Patient not taking: Reported on 8/17/2023) 15 tablet 0     No current facility-administered medications for this visit.      Allergies:     Allergies   Allergen Reactions    Penicillins Rash      Physical Exam:     /80   Pulse 70   Ht 5' 11\" " (1.803 m)   Wt 94.8 kg (209 lb)   SpO2 99%   BMI 29.15 kg/m²     Physical Exam  Constitutional:       Appearance: Normal appearance.   HENT:      Right Ear: Tympanic membrane normal. There is impacted cerumen.      Left Ear: Tympanic membrane normal. There is impacted cerumen.      Mouth/Throat:      Pharynx: No posterior oropharyngeal erythema.   Eyes:      Pupils: Pupils are equal, round, and reactive to light.   Cardiovascular:      Heart sounds: Normal heart sounds.   Pulmonary:      Breath sounds: Normal breath sounds.   Abdominal:      Palpations: Abdomen is soft.   Musculoskeletal:      Right lower leg: No edema.      Left lower leg: No edema.   Lymphadenopathy:      Cervical: No cervical adenopathy.   Neurological:      Mental Status: He is oriented to person, place, and time.   Psychiatric:         Mood and Affect: Mood normal.        Ear cerumen removal    Date/Time: 3/22/2024 9:20 AM    Performed by: Ira Garcia MD  Authorized by: Ira Garcia MD  Universal Protocol:  Consent: Verbal consent obtained.    Procedure details:     Location: Both ears.    Procedure type: curette      Approach:  External  Post-procedure details:     Complication:  Macerated skin    Patient tolerance of procedure:  Tolerated well, no immediate complications  Comments:      Started on Ciprodex eardrops in both the ears twice daily for 5 days.     Ira Garcia MD   [facility]

## 2024-03-22 NOTE — ASSESSMENT & PLAN NOTE
Patient has a history of dyslipidemia.  Noted to have elevated triglycerides, 307,  .   States got busy and was unable to pay attention to his diet.  Different treatment options discussed with patient.  Patient does not want to start any medications yet.  Patient is going to make some lifestyle changes and is hopeful that would improve his cholesterol panel.  Recommended to repeat labs at 4 months interval.

## 2024-05-01 PROBLEM — H61.23 BILATERAL IMPACTED CERUMEN: Status: RESOLVED | Noted: 2024-03-22 | Resolved: 2024-05-01

## 2024-09-04 ENCOUNTER — RA CDI HCC (OUTPATIENT)
Dept: OTHER | Facility: HOSPITAL | Age: 29
End: 2024-09-04